# Patient Record
Sex: FEMALE | Race: WHITE | Employment: OTHER | URBAN - METROPOLITAN AREA
[De-identification: names, ages, dates, MRNs, and addresses within clinical notes are randomized per-mention and may not be internally consistent; named-entity substitution may affect disease eponyms.]

---

## 2017-07-05 NOTE — PATIENT DISCUSSION
H/O MENINGIOMA, OS: PATIENT SAW DR. Amita Sands FOR FLU- LAST SEEN SEVERAL YEARS AGO. PATIENT TO CALL IF THIS PROBLEMS STARTS TO INTERFERE WITH VISION.

## 2017-07-05 NOTE — PATIENT DISCUSSION
STEPHENIE OU:  PRESCRIBED UV PROTECTION TO SLOW GROWTH. PRESCRIBE ARTIFICAL TEARS TO INCREASE COMFORT.

## 2018-06-04 ENCOUNTER — PREPPED CHART (OUTPATIENT)
Dept: URBAN - METROPOLITAN AREA CLINIC 30 | Facility: CLINIC | Age: 73
End: 2018-06-04

## 2018-09-05 NOTE — PATIENT DISCUSSION
H/O MENINGIOMA, OS: PATIENT SAW DR. Adenike Chang FOR FLU- LAST SEEN SEVERAL YEARS AGO. PATIENT TO CALL IF THIS PROBLEMS STARTS TO INTERFERE WITH VISION.

## 2021-02-03 ENCOUNTER — ESTABLISHED PATIENT (OUTPATIENT)
Dept: URBAN - METROPOLITAN AREA CLINIC 30 | Facility: CLINIC | Age: 76
End: 2021-02-03

## 2021-02-03 DIAGNOSIS — Z96.1: ICD-10-CM

## 2021-02-03 DIAGNOSIS — E11.9: ICD-10-CM

## 2021-02-03 DIAGNOSIS — H43.823: ICD-10-CM

## 2021-02-03 DIAGNOSIS — H35.373: ICD-10-CM

## 2021-02-03 PROCEDURE — 92014 COMPRE OPH EXAM EST PT 1/>: CPT

## 2021-02-03 PROCEDURE — 92250 FUNDUS PHOTOGRAPHY W/I&R: CPT

## 2021-02-03 PROCEDURE — 92015BEC REFRACTION BEC

## 2021-02-03 ASSESSMENT — VISUAL ACUITY
OD_SC: 20/25+2
OU_SC: 20/20-2
OS_SC: 20/20-2

## 2021-02-03 ASSESSMENT — KERATOMETRY
OS_AXISANGLE2_DEGREES: 131
OD_K2POWER_DIOPTERS: 47.75
OS_AXISANGLE_DEGREES: 041
OD_K1POWER_DIOPTERS: 48.75
OS_K1POWER_DIOPTERS: 48.75
OD_AXISANGLE2_DEGREES: 72
OD_AXISANGLE_DEGREES: 162
OS_K2POWER_DIOPTERS: 48.25

## 2021-02-03 ASSESSMENT — TONOMETRY
OD_IOP_MMHG: 10
OS_IOP_MMHG: 10

## 2022-01-24 ASSESSMENT — KERATOMETRY
OD_AXISANGLE2_DEGREES: 72
OS_K2POWER_DIOPTERS: 48.25
OS_AXISANGLE2_DEGREES: 131
OD_AXISANGLE_DEGREES: 162
OS_AXISANGLE_DEGREES: 041
OS_K1POWER_DIOPTERS: 48.75
OD_K2POWER_DIOPTERS: 47.75
OD_K1POWER_DIOPTERS: 48.75

## 2022-02-01 ENCOUNTER — COMPREHENSIVE EXAM (OUTPATIENT)
Dept: URBAN - METROPOLITAN AREA CLINIC 30 | Facility: CLINIC | Age: 77
End: 2022-02-01

## 2022-02-01 DIAGNOSIS — H43.823: ICD-10-CM

## 2022-02-01 DIAGNOSIS — Z96.1: ICD-10-CM

## 2022-02-01 DIAGNOSIS — E11.9: ICD-10-CM

## 2022-02-01 DIAGNOSIS — H35.373: ICD-10-CM

## 2022-02-01 PROCEDURE — 99214 OFFICE O/P EST MOD 30 MIN: CPT
